# Patient Record
Sex: MALE | Race: WHITE | NOT HISPANIC OR LATINO | URBAN - METROPOLITAN AREA
[De-identification: names, ages, dates, MRNs, and addresses within clinical notes are randomized per-mention and may not be internally consistent; named-entity substitution may affect disease eponyms.]

---

## 2019-05-20 ENCOUNTER — OFFICE VISIT (OUTPATIENT)
Dept: URBAN - METROPOLITAN AREA CLINIC 22 | Facility: CLINIC | Age: 74
End: 2019-05-20
Payer: MEDICARE

## 2019-05-20 DIAGNOSIS — H52.03 HYPERMETROPIA, BILATERAL: ICD-10-CM

## 2019-05-20 PROCEDURE — 92014 COMPRE OPH EXAM EST PT 1/>: CPT | Performed by: OPTOMETRIST

## 2019-05-20 PROCEDURE — 92004 COMPRE OPH EXAM NEW PT 1/>: CPT | Performed by: OPTOMETRIST

## 2019-05-20 ASSESSMENT — VISUAL ACUITY
OS: 20/25
OD: 20/25

## 2019-05-20 ASSESSMENT — INTRAOCULAR PRESSURE
OS: 18
OD: 18

## 2019-05-20 NOTE — IMPRESSION/PLAN
Impression: Age-related nuclear cataract, bilateral: H25.13. Plan: Discussed risks and benefits of cataract surgery with patient today. Patient will continue to monitor at this time  any vision changes or increase in glare symptoms. Patient will be seen in 1 year for CEE unless notices vision changes sooner.

## 2019-05-20 NOTE — IMPRESSION/PLAN
Impression: Unspecified pterygium of right eye: H11.001. Plan: Growing a little so he needs to lubricate the eye with drops. Pt to monitor and if it keeps growing he might have to be referred to surgery. Sustain or Refresh Advance OTC. Use 1 drop TID.

## 2020-06-30 ENCOUNTER — OFFICE VISIT (OUTPATIENT)
Dept: URBAN - METROPOLITAN AREA CLINIC 22 | Facility: CLINIC | Age: 75
End: 2020-06-30
Payer: MEDICARE

## 2020-06-30 DIAGNOSIS — H25.13 AGE-RELATED NUCLEAR CATARACT, BILATERAL: ICD-10-CM

## 2020-06-30 DIAGNOSIS — H43.313 VITREOUS MEMBRANES AND STRANDS, BILATERAL: ICD-10-CM

## 2020-06-30 PROCEDURE — 99214 OFFICE O/P EST MOD 30 MIN: CPT | Performed by: OPTOMETRIST

## 2020-06-30 ASSESSMENT — INTRAOCULAR PRESSURE
OD: 14
OS: 16

## 2020-06-30 NOTE — IMPRESSION/PLAN
Impression: Vitreous membranes and strands, bilateral: H43.313.  Plan: signs and symptoms of RD explained, RTC immediately

## 2021-03-24 ENCOUNTER — OFFICE VISIT (OUTPATIENT)
Dept: URBAN - METROPOLITAN AREA CLINIC 29 | Facility: CLINIC | Age: 76
End: 2021-03-24
Payer: MEDICARE

## 2021-03-24 DIAGNOSIS — H25.812 COMBINED FORMS OF AGE-RELATED CATARACT, LEFT EYE: ICD-10-CM

## 2021-03-24 PROCEDURE — 99204 OFFICE O/P NEW MOD 45 MIN: CPT | Performed by: OPHTHALMOLOGY

## 2021-03-24 ASSESSMENT — VISUAL ACUITY
OD: 20/40
OS: 20/30

## 2021-03-24 ASSESSMENT — KERATOMETRY
OS: 44.88
OD: 44.50

## 2021-03-24 ASSESSMENT — INTRAOCULAR PRESSURE
OD: 14
OS: 14

## 2021-03-24 NOTE — IMPRESSION/PLAN
Impression: Combined forms of age-related cataract, bilateral: H25.813. .  Visually significant, quality of life issue, could improve with surgery. Plan: Cataracts account for the patient's complaints. Discussed all risks, benefits, alternatives, procedures and recovery. Patient understands changing glasses will not improve vision. Patient desires to have surgery, recommend phacoemulsification with intraocular lens implant OD.  lvl 2 - pt considering toric vs vivity IOL - AIM plano. Re-evaluate OS for cataract surgery after OD is done. Not OK for Dexcyu.

## 2021-04-08 ENCOUNTER — TESTING ONLY (OUTPATIENT)
Dept: URBAN - METROPOLITAN AREA CLINIC 23 | Facility: CLINIC | Age: 76
End: 2021-04-08
Payer: MEDICARE

## 2021-04-08 DIAGNOSIS — H25.813 COMBINED FORMS OF AGE-RELATED CATARACT, BILATERAL: Primary | ICD-10-CM

## 2021-04-08 ASSESSMENT — PACHYMETRY
OD: 23.06
OS: 23.05
OS: 2.91
OD: 2.88

## 2021-04-19 ENCOUNTER — SURGERY (OUTPATIENT)
Dept: URBAN - METROPOLITAN AREA SURGERY 11 | Facility: SURGERY | Age: 76
End: 2021-04-19
Payer: MEDICARE

## 2021-04-19 PROCEDURE — 66984 XCAPSL CTRC RMVL W/O ECP: CPT | Performed by: OPHTHALMOLOGY

## 2021-04-20 ENCOUNTER — POST-OPERATIVE VISIT (OUTPATIENT)
Dept: URBAN - METROPOLITAN AREA CLINIC 29 | Facility: CLINIC | Age: 76
End: 2021-04-20
Payer: MEDICARE

## 2021-04-20 DIAGNOSIS — Z48.810 ENCOUNTER FOR SURGICAL AFTERCARE FOLLOWING SURGERY ON A SENSE ORGAN: Primary | ICD-10-CM

## 2021-04-20 RX ORDER — OFLOXACIN 3 MG/ML
0.3 % SOLUTION/ DROPS OPHTHALMIC
Qty: 5 | Refills: 1 | Status: INACTIVE
Start: 2021-04-20 | End: 2021-04-27

## 2021-04-20 ASSESSMENT — INTRAOCULAR PRESSURE
OD: 14
OS: 17

## 2021-04-20 NOTE — IMPRESSION/PLAN
Impression: S/P Lens: [id number] +21.50 OD - 1 Day. Encounter for surgical aftercare following surgery on a sense organ  Z48.810.  Post operative instructions reviewed - Plan: --Continue Ofloxacin 0.3% --Continue Pred/Brom

## 2021-04-28 ENCOUNTER — OFFICE VISIT (OUTPATIENT)
Dept: URBAN - METROPOLITAN AREA CLINIC 29 | Facility: CLINIC | Age: 76
End: 2021-04-28
Payer: MEDICARE

## 2021-04-28 ASSESSMENT — VISUAL ACUITY: OS: 20/30

## 2021-04-28 ASSESSMENT — INTRAOCULAR PRESSURE
OD: 15
OS: 14

## 2021-04-28 ASSESSMENT — KERATOMETRY
OS: 44.88
OD: 45.38

## 2021-04-28 NOTE — IMPRESSION/PLAN
Impression: Presence of intraocular lens: Z96.1. Right. Condition: established, stable. Plan: d/c abx, taper steroid per plan. d/c shield.  ADD tears

## 2021-04-28 NOTE — IMPRESSION/PLAN
Impression: Combined forms of age-related cataract, left eye: H25.812. .  Visually significant, quality of life issue, could improve with surgery. Plan: Cataracts account for the patient's complaints. Discussed all risks, benefits, alternatives, procedures and recovery. Patient understands changing glasses will not improve vision. Patient desires to have surgery, recommend phacoemulsification with intraocular lens implant OS. lvl 2 - pt wants vivity IOL - AIM plano.

## 2021-05-03 ENCOUNTER — SURGERY (OUTPATIENT)
Dept: URBAN - METROPOLITAN AREA SURGERY 11 | Facility: SURGERY | Age: 76
End: 2021-05-03
Payer: MEDICARE

## 2021-05-03 PROCEDURE — 66984 XCAPSL CTRC RMVL W/O ECP: CPT | Performed by: OPHTHALMOLOGY

## 2021-05-04 ENCOUNTER — POST-OPERATIVE VISIT (OUTPATIENT)
Dept: URBAN - METROPOLITAN AREA CLINIC 29 | Facility: CLINIC | Age: 76
End: 2021-05-04
Payer: MEDICARE

## 2021-05-04 RX ORDER — OFLOXACIN 3 MG/ML
0.3 % SOLUTION/ DROPS OPHTHALMIC
Qty: 5 | Refills: 1 | Status: INACTIVE
Start: 2021-05-04 | End: 2021-05-10

## 2021-05-04 ASSESSMENT — INTRAOCULAR PRESSURE
OS: 19
OD: 16

## 2021-05-04 NOTE — IMPRESSION/PLAN
Impression: S/P Phaco - Vivity IOL [id number] + 20.50 OS - 1 Day. Presence of intraocular lens  Z96.1.  Post operative instructions reviewed - Plan: --Continue all meds

## 2021-05-10 ENCOUNTER — POST-OPERATIVE VISIT (OUTPATIENT)
Dept: URBAN - METROPOLITAN AREA CLINIC 29 | Facility: CLINIC | Age: 76
End: 2021-05-10
Payer: MEDICARE

## 2021-05-10 DIAGNOSIS — Z96.1 PRESENCE OF INTRAOCULAR LENS: Primary | ICD-10-CM

## 2021-05-10 ASSESSMENT — INTRAOCULAR PRESSURE
OD: 16
OS: 16

## 2021-05-10 NOTE — IMPRESSION/PLAN
Impression: S/P Phaco - Vivity IOL [id number] + 20.50 OS - 7 Days. Presence of intraocular lens  Z96.1. Plan: RTC 3-4 weeks x final PO, refract prn.  --Taper all meds as directed

## 2021-06-07 ENCOUNTER — POST-OPERATIVE VISIT (OUTPATIENT)
Dept: URBAN - METROPOLITAN AREA CLINIC 29 | Facility: CLINIC | Age: 76
End: 2021-06-07
Payer: MEDICARE

## 2021-06-07 PROCEDURE — 99024 POSTOP FOLLOW-UP VISIT: CPT | Performed by: OPTOMETRIST

## 2021-06-07 ASSESSMENT — INTRAOCULAR PRESSURE
OS: 16
OD: 16

## 2021-06-07 NOTE — IMPRESSION/PLAN
Impression: S/P Phaco - Vivity IOL [id number] + 20.50 OS - 35 Days. Presence of intraocular lens  Z96.1. Plan: RTC 3-4 months x CFM. --Advised patient to use artificial tears for comfort.

## 2021-09-13 ENCOUNTER — OFFICE VISIT (OUTPATIENT)
Dept: URBAN - METROPOLITAN AREA CLINIC 25 | Facility: CLINIC | Age: 76
End: 2021-09-13
Payer: MEDICARE

## 2021-09-13 DIAGNOSIS — H04.123 DRY EYE SYNDROME OF BILATERAL LACRIMAL GLANDS: Primary | ICD-10-CM

## 2021-09-13 DIAGNOSIS — H11.001 UNSPECIFIED PTERYGIUM OF RIGHT EYE: ICD-10-CM

## 2021-09-13 PROCEDURE — 99213 OFFICE O/P EST LOW 20 MIN: CPT | Performed by: OPTOMETRIST

## 2021-09-13 ASSESSMENT — INTRAOCULAR PRESSURE
OD: 13
OS: 14

## 2021-09-13 NOTE — IMPRESSION/PLAN
Impression: Presence of intraocular lens: Z96.1 Right. Plan: Condition is stable, no further treatment at this time. Recommend mild OTC reading glasses for near work.

## 2021-09-13 NOTE — IMPRESSION/PLAN
Impression: Unspecified pterygium of right eye: H11.001. Plan: Condition is stable, no further treatment at this time. Monitor.